# Patient Record
Sex: MALE | Race: ASIAN | NOT HISPANIC OR LATINO | URBAN - METROPOLITAN AREA
[De-identification: names, ages, dates, MRNs, and addresses within clinical notes are randomized per-mention and may not be internally consistent; named-entity substitution may affect disease eponyms.]

---

## 2022-02-11 ENCOUNTER — EMERGENCY (EMERGENCY)
Facility: HOSPITAL | Age: 42
LOS: 1 days | Discharge: HOME | End: 2022-02-11
Attending: EMERGENCY MEDICINE | Admitting: EMERGENCY MEDICINE
Payer: COMMERCIAL

## 2022-02-11 VITALS
SYSTOLIC BLOOD PRESSURE: 136 MMHG | HEART RATE: 89 BPM | HEIGHT: 67 IN | TEMPERATURE: 98 F | WEIGHT: 149.91 LBS | RESPIRATION RATE: 18 BRPM | OXYGEN SATURATION: 97 % | DIASTOLIC BLOOD PRESSURE: 88 MMHG

## 2022-02-11 VITALS
SYSTOLIC BLOOD PRESSURE: 129 MMHG | DIASTOLIC BLOOD PRESSURE: 76 MMHG | HEART RATE: 76 BPM | OXYGEN SATURATION: 97 % | TEMPERATURE: 98 F | RESPIRATION RATE: 20 BRPM

## 2022-02-11 PROCEDURE — 73110 X-RAY EXAM OF WRIST: CPT | Mod: 26,RT

## 2022-02-11 PROCEDURE — 73130 X-RAY EXAM OF HAND: CPT | Mod: 26,RT

## 2022-02-11 PROCEDURE — 73130 X-RAY EXAM OF HAND: CPT

## 2022-02-11 PROCEDURE — 99283 EMERGENCY DEPT VISIT LOW MDM: CPT

## 2022-02-11 PROCEDURE — 73110 X-RAY EXAM OF WRIST: CPT

## 2022-02-11 PROCEDURE — 99284 EMERGENCY DEPT VISIT MOD MDM: CPT | Mod: 25

## 2022-02-11 NOTE — ED PROVIDER NOTE - OBJECTIVE STATEMENT
42 yo M presents to ED c/o right hand pipe sp blunt trauma hrs prior to arrival; pt states a heavy metal pipe fell onto his hand. Denies numbness/tingling. Does not want anything for pain. Tetanus last year as per pt.

## 2022-02-11 NOTE — ED PROVIDER NOTE - PROGRESS NOTE DETAILS
no acute fx noted on XR as read by both attending and PA; given clinical concern pt splinted; advised fu with ortho/hand. pt agrees. Discussed the results of all diagnostic testing in ED.   Pt was given an opportunity to have all questions answered to satisfaction.  Discussed the importance of prompt, close ortho follow-up. ED return precautions discussed at length.  Pt verbalizes agreement and understanding of plan and ED return precautions. Pt well appearing, stable for DC home. No emergent concerns at this time.

## 2022-02-11 NOTE — ED PROVIDER NOTE - ATTENDING CONTRIBUTION TO CARE
pt is a 42 yo M presents to ED c/o right hand pain and swelling  sp blunt trauma hrs prior to arrival; pt states a heavy metal pipe fell onto his hand.  pt with pain and swelling, no numbness. pt on exam with  pain and swelling over dorsum or hand more to radial aspect, including thumb, from at all joint, sm intact,  xray hand and wrist neg,  splint, d/c fu hand surg

## 2022-02-11 NOTE — ED PROVIDER NOTE - NSFOLLOWUPINSTRUCTIONS_ED_ALL_ED_FT
1) Follow up with orthopedics/hand on Monday.  2) Return to the ED immediately for new or worsening symptoms as we discussed.      Hand Contusion      A hand contusion is a deep bruise to the hand. Contusions are the result of a blunt injury to tissues and muscle fibers under the skin. The injury causes bleeding under the skin. The skin overlying the contusion may turn blue, purple, or yellow. Minor injuries may cause a painless contusion, but more severe injuries may cause contusions that stay painful and swollen for a few weeks.      What are the causes?    This condition is usually caused by a hard hit or direct force to your hand, such as having a heavy object fall on your hand.      What are the signs or symptoms?    Symptoms of this condition include:  •A swollen hand.      •Pain and tenderness in your hand.      •Discoloration of your hand. The area may have redness and then turn blue, purple, or yellow.        How is this diagnosed?    This condition is diagnosed based on:  •A physical exam.      •Your medical history.    •Imaging studies, such as:  •An X-ray. This may be needed to check for other injuries, such as broken bones (fractures).      •A CT scan or an MRI. This may be done if your health care provider thinks you have torn or injured ligaments.          How is this treated?    This condition may be treated with:  •Rest, ice, pressure (compression), and raising (elevating) the injured area. This is often called RICE therapy.      •An elastic wrap to support your hand.      •Over-the-counter medicines to control pain.        Follow these instructions at home:      RICE therapy      •Rest the injured area.    •If directed, put ice on the injured area.  •Put ice in a plastic bag.      •Place a towel between your skin and the bag.      •Leave the ice on for 20 minutes, 2–3 times a day.      •If directed, apply light compression to the injured area using an elastic wrap.  •Make sure the wrap is not too tight.      •If your fingers become numb or turn cold or blue, take the wrap off and reapply it more loosely.      •Remove and reapply the wrap as told by your health care provider.        •Raise (elevate) the injured area above the level of your heart while you are sitting or lying down.      General instructions     •Take over-the-counter and prescription medicines only as told by your health care provider.      •Protect your hand from getting injured further.      •Keep all follow-up visits as told by your health care provider. This is important.        Contact a health care provider if:    •Your symptoms do not improve after several days of treatment.      •You have increased redness, swelling, or pain in your hand or fingers.      •You have difficulty moving the injured area.      •Your swelling or pain is not relieved with medicines.        Get help right away if:    •You have severe pain.      •Your hand or fingers become numb.      •Your hand or fingers turn pale, blue, or cold.      •You cannot move your hand or wrist.      •Your hand is warm to the touch.        Summary    •A hand contusion is a deep bruise to the hand.      •Contusions are the result of a blunt injury to tissues and muscle fibers under the skin.      •This injury is treated with rest, ice, compression and elevation.      This information is not intended to replace advice given to you by your health care provider. Make sure you discuss any questions you have with your health care provider.      Document Revised: 10/16/2019 Document Reviewed: 10/16/2019    [employer] Patient Education © 2021 [employer]

## 2022-02-11 NOTE — ED PROVIDER NOTE - CARE PROVIDER_API CALL
show Anatoliy Denney  ORTHOPAEDIC SURGERY  [address] [place] [address]  Phone: [phone]  Fax: [phone]  Follow Up Time:     Maureen De Anda)  Plastic Surgery; Surgery  [address] [facility], [address]  [place] [address]  Phone: [phone]  Fax: [phone]  Follow Up Time:

## 2022-02-11 NOTE — ED PROVIDER NOTE - PATIENT PORTAL LINK FT
You can access the FollowMyHealth Patient Portal offered by [facility] by registering at the following website: [email or web address]. By joining [employer]’s FollowMyHealth portal, you will also be able to view your health information using other applications (apps) compatible with our system.

## 2022-02-15 PROBLEM — Z00.00 ENCOUNTER FOR PREVENTIVE HEALTH EXAMINATION: Status: ACTIVE | Noted: 2022-02-15

## 2022-02-17 ENCOUNTER — APPOINTMENT (OUTPATIENT)
Dept: ORTHOPEDIC SURGERY | Facility: CLINIC | Age: 42
End: 2022-02-17
Payer: COMMERCIAL

## 2022-02-17 VITALS — WEIGHT: 150 LBS | HEIGHT: 67 IN | BODY MASS INDEX: 23.54 KG/M2

## 2022-02-17 DIAGNOSIS — S60.221A CONTUSION OF RIGHT HAND, INITIAL ENCOUNTER: ICD-10-CM

## 2022-02-17 PROCEDURE — 99203 OFFICE O/P NEW LOW 30 MIN: CPT

## 2022-02-17 NOTE — ADDENDUM
[FreeTextEntry1] : I, Padmini Rushing wrote this note acting as a scribe for Dr. Mil Kwon on Feb 17, 2022.

## 2022-02-17 NOTE — HISTORY OF PRESENT ILLNESS
[Right] : right hand dominant [FreeTextEntry1] : Pt is a 42 y/o male with right hand contusion.  A heavy  pipe fell on his right hand on 2/11/22.  He had pain immediately.  His hand became swollen and ecchymotic.  He went to [facility] ED where xrays were negative for fracture.  A splint was applied and he was advised to follow up here.  He states that the pain has improved a great deal and the swelling has improved as well.

## 2022-02-17 NOTE — END OF VISIT
[FreeTextEntry3] : All medical record entries made by the Scribe were at my,  Dr. Mil Kwon MD., direction and personally dictated by me on 02/17/2022. I have personally reviewed the chart and agree that the record accurately reflects my personal performance of the history, physical exam, assessment and plan.

## 2022-02-17 NOTE — PHYSICAL EXAM
[de-identified] : Patient is WDWN, alert, and in no acute distress. Breathing is unlabored. He is grossly oriented to person, place, and time.\par \par Right Hand (Thumb):\par Bruising and discoloration noted to the wrist and hand secondary to injury\par No skin lesions or deformities\par Edema noted in the area of the CMC joint of the right thumb\par Full digital motion present\par Full wrist motion\par Sensation to the digits intact.  [de-identified] : EXAM: XR WRIST COMP MIN 3 VIEWS RT\par PROCEDURE DATE: 02/11/2022\par IMPRESSION: No acute bony pathology.\par Note - This does not exclude fractures in perfect alignment and apposition as presence may be indicated at one to three weeks following callus formation.\par \par BINTA DAVIES MD; Attending Interventional Radiologist\par This document has been electronically signed. Feb 12 2022 10:42AM\par \par ------------------------------------------------------------------------------------------------------------------------------------------------------------------------------------\par \par EXAM: XR HAND MIN 3 VIEWS RT\par PROCEDURE DATE: 02/11/2022\par IMPRESSION: No acute bony pathology.\par Note - This does not exclude fractures in perfect alignment and apposition as presence may be indicated at one to three weeks following callus formation.\par \par BINTA DAVIES MD; Attending Interventional Radiologist\par This document has been electronically signed. Feb 12 2022 10:39AM

## 2022-02-17 NOTE — DISCUSSION/SUMMARY
[FreeTextEntry1] : The underlying pathophysiology was reviewed with the patient. XR films were reviewed with the patient. Discussed at length the nature of the patient’s condition. The right hand symptoms appear secondary to crush injury. \par \par At this time, xrays obtained at [facility] on 2/11/22 were reviewed and interpreted by me in office today. He and his wife were once again advised that no fracture is evident. He was encouraged in gentle ROM exercises of the hand as well as to soak the hand in warm water and Epsom salts. I advised him to use the hand normally for ADLs as tolerated.\par \par All questions answered, understanding verbalized. Patient in agreement with plan of care. Follow up in 4 weeks if needed.

## 2022-12-06 NOTE — ED PROVIDER NOTE - CONSTITUTIONAL, MLM
Left message for patient to contact office to assist with sick call  Well appearing, awake, alert, oriented to person, place, time/situation and in no apparent distress. normal...